# Patient Record
(demographics unavailable — no encounter records)

---

## 2018-04-24 NOTE — MMO
BILATERAL MAMMOGRAMS:

 

HISTORY:

Screening mammography.

 

COMPARISON:

02/01/2016

 

FINDINGS:

Scattered fibroglandular densities and benign appearing calcifications are apparent.  Focal asymmetri
es at the axillary tail of each breast are stable.  No new dominant mass or suspicious calcifications
.  The study was evaluated with the assistance of computer aided detection.

 

IMPRESSION:

BI-RADS Category 2: Benign findings.

 

Suggest routine followup.

 

POS: [place]